# Patient Record
Sex: MALE | Race: WHITE | NOT HISPANIC OR LATINO | Employment: FULL TIME
[De-identification: names, ages, dates, MRNs, and addresses within clinical notes are randomized per-mention and may not be internally consistent; named-entity substitution may affect disease eponyms.]

---

## 2021-06-07 ENCOUNTER — TRANSCRIBE ORDERS (OUTPATIENT)
Dept: OTHER | Age: 61
End: 2021-06-07

## 2021-06-07 ENCOUNTER — HOSPITAL ENCOUNTER (OUTPATIENT)
Dept: CARDIOLOGY | Facility: CLINIC | Age: 61
Discharge: HOME | End: 2021-06-07
Attending: INTERNAL MEDICINE | Admitting: INTERNAL MEDICINE
Payer: COMMERCIAL

## 2021-06-07 DIAGNOSIS — R07.9 CHEST PAIN: ICD-10-CM

## 2021-06-07 DIAGNOSIS — R91.8 PULMONARY NODULES: Primary | ICD-10-CM

## 2021-06-07 DIAGNOSIS — R07.9 CHEST PAIN: Primary | ICD-10-CM

## 2021-06-07 PROCEDURE — 255N000002 HC RX 255 OP 636: Performed by: STUDENT IN AN ORGANIZED HEALTH CARE EDUCATION/TRAINING PROGRAM

## 2021-06-07 PROCEDURE — 93325 DOPPLER ECHO COLOR FLOW MAPG: CPT | Mod: TC

## 2021-06-07 PROCEDURE — 93018 CV STRESS TEST I&R ONLY: CPT | Performed by: STUDENT IN AN ORGANIZED HEALTH CARE EDUCATION/TRAINING PROGRAM

## 2021-06-07 PROCEDURE — 93350 STRESS TTE ONLY: CPT | Mod: 26 | Performed by: STUDENT IN AN ORGANIZED HEALTH CARE EDUCATION/TRAINING PROGRAM

## 2021-06-07 PROCEDURE — 93016 CV STRESS TEST SUPVJ ONLY: CPT | Performed by: STUDENT IN AN ORGANIZED HEALTH CARE EDUCATION/TRAINING PROGRAM

## 2021-06-07 PROCEDURE — 93325 DOPPLER ECHO COLOR FLOW MAPG: CPT | Mod: 26 | Performed by: STUDENT IN AN ORGANIZED HEALTH CARE EDUCATION/TRAINING PROGRAM

## 2021-06-07 PROCEDURE — 93321 DOPPLER ECHO F-UP/LMTD STD: CPT | Mod: 26 | Performed by: STUDENT IN AN ORGANIZED HEALTH CARE EDUCATION/TRAINING PROGRAM

## 2021-06-07 RX ADMIN — PERFLUTREN 5 ML: 6.52 INJECTION, SUSPENSION INTRAVENOUS at 14:02

## 2021-06-07 NOTE — PROGRESS NOTES
Date: 6/7/2021    Time of Call: 8:43 AM     Diagnosis:  Chest pain     [ TORB ] Ordering provider: Yobany Lopez MD  Order: Exercise stress ECHO     Order received by: Meredith Kaufman RN     Follow-up/additional notes:

## 2021-06-08 NOTE — TELEPHONE ENCOUNTER
RECORDS STATUS - ALL OTHER DIAGNOSIS      RECORDS RECEIVED FROM: [place]   DATE RECEIVED: 6/8/21   NOTES STATUS DETAILS   OFFICE NOTE from referring provider CE - [place] 6/4/21   OFFICE NOTE from medical oncologist     DISCHARGE SUMMARY from hospital     DISCHARGE REPORT from the ER     OPERATIVE REPORT     MEDICATION LIST [facility]   CLINICAL TRIAL TREATMENTS TO DATE     LABS     PATHOLOGY REPORTS NA    ANYTHING RELATED TO DIAGNOSIS Epic/CE 5/13/21   GENONOMIC TESTING     TYPE:     IMAGING (NEED IMAGES & REPORT)     CT SCANS PACS 6/3/21, 6/7/16: [place]   MRI     MAMMO     ULTRASOUND     PET

## 2021-06-09 ENCOUNTER — VIRTUAL VISIT (OUTPATIENT)
Dept: PULMONOLOGY | Facility: CLINIC | Age: 61
End: 2021-06-09
Attending: INTERNAL MEDICINE
Payer: COMMERCIAL

## 2021-06-09 ENCOUNTER — PRE VISIT (OUTPATIENT)
Dept: PULMONOLOGY | Facility: CLINIC | Age: 61
End: 2021-06-09

## 2021-06-09 DIAGNOSIS — R91.8 PULMONARY NODULES: ICD-10-CM

## 2021-06-09 PROCEDURE — 99204 OFFICE O/P NEW MOD 45 MIN: CPT | Mod: 95 | Performed by: INTERNAL MEDICINE

## 2021-06-09 RX ORDER — SIMVASTATIN 20 MG
TABLET ORAL
COMMUNITY
Start: 2020-12-28

## 2021-06-09 RX ORDER — WARFARIN SODIUM 5 MG/1
TABLET ORAL
COMMUNITY
Start: 2020-12-28

## 2021-06-09 NOTE — PROGRESS NOTES
"Mendez is a 60 year old who is being evaluated via a billable video visit.      How would you like to obtain your AVS? Mail a copy  If the video visit is dropped, the invitation should be resent by: Send to e-mail at: [email or web address]  Will anyone else be joining your video visit? No    Vitals - Patient Reported  Weight (Patient Reported): 87.5 kg (193 lb)  Height (Patient Reported): 180.3 cm (5' 11\")  BMI (Based on Pt Reported Ht/Wt): 26.92  Pain Score: Mild Pain (2)  Pain Loc: Other - see comment(MID RIGHT BACK)    Video-Visit Details    Converted to phone visit because of technical difficulties (Patient could not open the e-mail link)    Phone time: 28 minutes  Total visit time: 46 minutes    [facility]  Interventional Pulmonary Clinic Virtual Visit Note    June 9, 2021    Chief complaint:  Idris Low is a 60 year old male seen for   Chief Complaint   Patient presents with     Video Visit     NEW; Pulmonary nodules       Reason for clinic visit / Chief complaint:   Pulmonary nodule    Assessment and Plan:  Indeterminate pulmonary nodule, measuring 10 x 7 mm in the right lower lobe.  This is new when compared to previous CT chest from 2016.  I reviewed images of CT scan that were done at [facility].  Patient has not seen a pulmonologist before.  He is interviewed with his brother over the phone today who is a cardiologist.  We discussed possible etiologies of pulmonary nodule and diagnostic approaches, being CT-guided biopsy, PET scan, follow-up CT scan (such as 3 months) or wedge resection.  I I indicated to him that we will review his case this Friday in our multidisciplinary meeting and call him back with the group's decision he is in agreement with this plan.  He indicated that he would prefer not to wait and have an answer as soon as possible.  Chest pain, jabbing, exacerbated by laying down on the right side or coughing.  Likely musculoskeletal in origin as pulmonary nodules " would not cause any such pain.  He did have cardiac work-up in the recent  past.  Smoker, quit 2 weeks ago he has been smoking 45 years averaging pack a day.  COPD changes on CT scan.  Patient has no shortness of breath however has occasional phlegm production which is white.  He has never been hospitalized or visited an emergency room because of respiratory related issues in the past and never used inhaler. No PFTs done in the past. Once the nodule is addressed he will need to have PFTs.  Lower extremity DVT diagnosed 15 years ago and has been on Coumadin.      Billing: Based on Medical Decision Making (Complexity):  L4    History of Present Illness:  This is a 60 years old gentleman again accompanied by his brother who is a physician on today's visit.  His main symptom is chest pain which is jabbing pain and exacerbated by body positioning and cough as described above.  Unlikely to be cardiac origin, denied having other respiratory symptoms except for occasional phlegm production.  No wheezing or shortness of breath.  Has been on Coumadin for the last 15 years because of right lower extremity DVT.  No family history of pulmonary problems or exposure to chemicals, radiation or asbestos in the past.  He has never used inhalers although he has significant changes suggesting COPD/emphysema on his recent CT scan.  He has been working as a .       No Known Allergies     No past medical history on file.     No past surgical history on file.     Social History     Socioeconomic History     Marital status:      Spouse name: Not on file     Number of children: Not on file     Years of education: Not on file     Highest education level: Not on file   Occupational History     Not on file   Social Needs     Financial resource strain: Not on file     Food insecurity     Worry: Not on file     Inability: Not on file     Transportation needs     Medical: Not on file     Non-medical: Not on file   Tobacco Use      Smoking status: Not on file   Substance and Sexual Activity     Alcohol use: Not on file     Drug use: Not on file     Sexual activity: Not on file   Lifestyle     Physical activity     Days per week: Not on file     Minutes per session: Not on file     Stress: Not on file   Relationships     Social connections     Talks on phone: Not on file     Gets together: Not on file     Attends Protestant service: Not on file     Active member of club or organization: Not on file     Attends meetings of clubs or organizations: Not on file     Relationship status: Not on file     Intimate partner violence     Fear of current or ex partner: Not on file     Emotionally abused: Not on file     Physically abused: Not on file     Forced sexual activity: Not on file   Other Topics Concern     Not on file   Social History Narrative     Not on file        No family history on file.     Immunization History   Administered Date(s) Administered     COVID-19,SANYA,Pfizer 03/24/2021, 04/14/2021       Current Outpatient Medications   Medication Sig     simvastatin (ZOCOR) 20 MG tablet One tablet daily. Pt due for annual exam prior to further refills.     warfarin ANTICOAGULANT (COUMADIN) 5 MG tablet Take 2.5 mg on Tuesdays and Saturdays and 5 mg all other days of the week or as directed by coag clinic.     No current facility-administered medications for this visit.         Review of Systems:  I have done 10 points of review systems and all negative except for those mentioned in HPI    Physical examination  Constitutional: Oriented, not in distress  Respiratory: Normal tidal breathing, no shortness of breath, no audible wheezing or stridor over the phone or video visit  Neurological: Alert, orientedx3  Psychiatric:  Mood and affect are appropriate with insight into his/her medical condition      DONAL Daugherty MD

## 2021-06-09 NOTE — LETTER
Date:Rachel 10, 2021      Patient was self referred, no letter generated. Do not send.        [facility] Health Information

## 2021-06-09 NOTE — LETTER
"6/9/2021       RE: Idris Low  [address]  [place] [address]     Dear Colleague,    [facility] you [facility] your [facility] Idris Low[facility]. Please see a copy of my visit note below.    Mendez is a 60 year old who is being evaluated via a billable video visit.      How would you like to obtain your AVS? Mail a copy  If the video visit is dropped, the invitation should be resent by: Send to e-mail at: [email or web address]  Will anyone else be joining your video visit? No    Vitals - Patient Reported  Weight (Patient Reported): 87.5 kg (193 lb)  Height (Patient Reported): 180.3 cm (5' 11\")  BMI (Based on Pt Reported Ht/Wt): 26.92  Pain Score: Mild Pain (2)  Pain Loc: Other - see comment(MID RIGHT BACK)    Video-Visit Details    Converted to phone visit because of technical difficulties (Patient could not open the e-mail link)    Phone time: 28 minutes  Total visit time: 46 minutes    [facility]  Interventional Pulmonary Clinic Virtual Visit Note    June 9, 2021    Chief complaint:  Idris Low is a 60 year old male seen for   Chief Complaint   Patient presents with     Video Visit     NEW; Pulmonary nodules       Reason for clinic visit / Chief complaint:   Pulmonary nodule    Assessment and Plan:  Indeterminate pulmonary nodule, measuring 10 x 7 mm in the right lower lobe.  This is new when compared to previous CT chest from 2016.  I reviewed images of CT scan that were done at [facility].  Patient has not seen a pulmonologist before.  He is interviewed with his brother over the phone today who is a cardiologist.  We discussed possible etiologies of pulmonary nodule and diagnostic approaches, being CT-guided biopsy, PET scan, follow-up CT scan (such as 3 months) or wedge resection.  I I indicated to him that we will review his case this Friday in our multidisciplinary meeting and " call him back with the group's decision he is in agreement with this plan.  He indicated that he would prefer not to wait and have an answer as soon as possible.  Chest pain, jabbing, exacerbated by laying down on the right side or coughing.  Likely musculoskeletal in origin as pulmonary nodules would not cause any such pain.  He did have cardiac work-up in the recent  past.  Smoker, quit 2 weeks ago he has been smoking 45 years averaging pack a day.  COPD changes on CT scan.  Patient has no shortness of breath however has occasional phlegm production which is white.  He has never been hospitalized or visited an emergency room because of respiratory related issues in the past and never used inhaler. No PFTs done in the past. Once the nodule is addressed he will need to have PFTs.  Lower extremity DVT diagnosed 15 years ago and has been on Coumadin.      Billing: Based on Medical Decision Making (Complexity):  L4    History of Present Illness:  This is a 60 years old gentleman again accompanied by his brother who is a physician on today's visit.  His main symptom is chest pain which is jabbing pain and exacerbated by body positioning and cough as described above.  Unlikely to be cardiac origin, denied having other respiratory symptoms except for occasional phlegm production.  No wheezing or shortness of breath.  Has been on Coumadin for the last 15 years because of right lower extremity DVT.  No family history of pulmonary problems or exposure to chemicals, radiation or asbestos in the past.  He has never used inhalers although he has significant changes suggesting COPD/emphysema on his recent CT scan.  He has been working as a .       No Known Allergies     No past medical history on file.     No past surgical history on file.     Social History     Socioeconomic History     Marital status:      Spouse name: Not on file     Number of children: Not on file     Years of education: Not on file      Highest education level: Not on file   Occupational History     Not on file   Social Needs     Financial resource strain: Not on file     Food insecurity     Worry: Not on file     Inability: Not on file     Transportation needs     Medical: Not on file     Non-medical: Not on file   Tobacco Use     Smoking status: Not on file   Substance and Sexual Activity     Alcohol use: Not on file     Drug use: Not on file     Sexual activity: Not on file   Lifestyle     Physical activity     Days per week: Not on file     Minutes per session: Not on file     Stress: Not on file   Relationships     Social connections     Talks on phone: Not on file     Gets together: Not on file     Attends Restoration service: Not on file     Active member of club or organization: Not on file     Attends meetings of clubs or organizations: Not on file     Relationship status: Not on file     Intimate partner violence     Fear of current or ex partner: Not on file     Emotionally abused: Not on file     Physically abused: Not on file     Forced sexual activity: Not on file   Other Topics Concern     Not on file   Social History Narrative     Not on file        No family history on file.     Immunization History   Administered Date(s) Administered     COVID-19,PF,Pfizer 03/24/2021, 04/14/2021       Current Outpatient Medications   Medication Sig     simvastatin (ZOCOR) 20 MG tablet One tablet daily. Pt due for annual exam prior to further refills.     warfarin ANTICOAGULANT (COUMADIN) 5 MG tablet Take 2.5 mg on Tuesdays and Saturdays and 5 mg all other days of the week or as directed by coag clinic.     No current facility-administered medications for this visit.         Review of Systems:  I have done 10 points of review systems and all negative except for those mentioned in HPI    Physical examination  Constitutional: Oriented, not in distress  Respiratory: Normal tidal breathing, no shortness of breath, no audible wheezing or stridor over the  phone or video visit  Neurological: Alert, orientedx3  Psychiatric:  Mood and affect are appropriate with insight into his/her medical condition      DONAL Daugherty MD        Again, thank you for allowing me to participate in the care of your patient.      Sincerely,    Bhavik Daugherty MD

## 2021-06-17 ENCOUNTER — TELEPHONE (OUTPATIENT)
Dept: SURGERY | Facility: CLINIC | Age: 61
End: 2021-06-17

## 2021-06-17 NOTE — TELEPHONE ENCOUNTER
Called patient as he was called two times by scheduling and he did not answer, his voicemail box was also full. Also called patients spouse and left a voicemail asking patient to call us back.

## 2025-01-07 ENCOUNTER — ANCILLARY PROCEDURE (OUTPATIENT)
Dept: GENERAL RADIOLOGY | Facility: CLINIC | Age: 65
End: 2025-01-07
Attending: PHYSICIAN ASSISTANT
Payer: COMMERCIAL

## 2025-01-07 ENCOUNTER — OFFICE VISIT (OUTPATIENT)
Dept: URGENT CARE | Facility: URGENT CARE | Age: 65
End: 2025-01-07
Payer: COMMERCIAL

## 2025-01-07 VITALS
SYSTOLIC BLOOD PRESSURE: 157 MMHG | HEART RATE: 78 BPM | OXYGEN SATURATION: 99 % | WEIGHT: 198 LBS | RESPIRATION RATE: 16 BRPM | TEMPERATURE: 98.2 F | DIASTOLIC BLOOD PRESSURE: 89 MMHG

## 2025-01-07 DIAGNOSIS — M79.642 PAIN OF LEFT HAND: ICD-10-CM

## 2025-01-07 DIAGNOSIS — S69.92XA HAND INJURY, LEFT, INITIAL ENCOUNTER: Primary | ICD-10-CM

## 2025-01-07 DIAGNOSIS — S61.412A LACERATION OF LEFT HAND WITHOUT FOREIGN BODY, INITIAL ENCOUNTER: ICD-10-CM

## 2025-01-07 DIAGNOSIS — S69.92XA HAND INJURY, LEFT, INITIAL ENCOUNTER: ICD-10-CM

## 2025-01-07 DIAGNOSIS — S60.222A CONTUSION OF LEFT HAND, INITIAL ENCOUNTER: ICD-10-CM

## 2025-01-07 DIAGNOSIS — Z79.01 LONG TERM CURRENT USE OF ANTICOAGULANT THERAPY: ICD-10-CM

## 2025-01-07 PROCEDURE — 73130 X-RAY EXAM OF HAND: CPT | Mod: TC | Performed by: RADIOLOGY

## 2025-01-07 RX ORDER — ATORVASTATIN CALCIUM 40 MG/1
TABLET, FILM COATED ORAL
COMMUNITY

## 2025-01-07 RX ORDER — SILDENAFIL 100 MG/1
100 TABLET, FILM COATED ORAL
COMMUNITY
Start: 2024-01-05

## 2025-01-08 NOTE — PATIENT INSTRUCTIONS
January 7, 2025 Urgent Care Plan:     -Call the number I provided to check on your final x-ray report (radiologist report) late tomorrow (late afternoon or early evening)     -I applied surgical adhesive to your superficial left hand laceration today. Do not apply topical antibiotic or other ointment as that will dissolve the adhesive.     -I prescribed an antibiotic (Augmentin) for infection prevention.     -You should check with your INR team to let them know you have been prescribed antibiotic. You INR can be affected by antibiotics. Your INR nurse will let you know if they would like you to have a short interval INR check.     -Watch for signs and symptoms of infection (as we reviewed today)

## 2025-01-08 NOTE — PROGRESS NOTES
ASSESSMENT/PLAN:    (S69.92XA) Hand injury, left, initial encounter  (primary encounter diagnosis)    MDM: Left hand contusion and superficial laceration dorsum of left hand related to work injury sustained while working with 447 pound pig as described in HPI.  X-ray is negative for acute fracture on my impression.  Interestingly, there is an opaque foreign body finding in an unrelated area.  Patient states he has no history of prior injury or prior suspected foreign body in this area.  Current injury does not involve area of radiopaque finding/no concern for acute FB on xray in area of current injury.  I reviewed the actual x-ray images with patient today during his urgent care visit.  Final radiology report is pending, but I suspect artifact is because of radiopaque finding.  Discussed with patient distant foreign body that he does not recall is also possible.  A laceration is superficial, without active bleeding, and I advised sutures will not improve approximation, so surgical adhesive was applied instead.  Wound was cleaned with Hibiclens and sterile water prior to application of surgical adhesive.  As per patient's desire, prophylactic antibiotic (Augmentin) was prescribed.  Patient is educated he will need to reach out to INR team to have a short interval INR recheck (his use of antibiotics can affect INR).  Criteria for follow-up were reviewed.  Please also see the below discharge summary/plan (which I reviewed with patient verbally today and provided in printed form for home review).    Plan: XR Hand Left G/E 3 Views,         amoxicillin-clavulanate (AUGMENTIN) 875-125 MG         tablet          AVS/Plan:     January 7, 2025 Urgent Care Plan:     -Call the number I provided to check on your final x-ray report (radiologist report) late tomorrow (late afternoon or early evening)     -I applied surgical adhesive to your superficial left hand laceration today. Do not apply topical antibiotic or other  "ointment as that will dissolve the adhesive.     -I prescribed an antibiotic (Augmentin) for infection prevention.     -You should check with your INR team to let them know you have been prescribed antibiotic. You INR can be affected by antibiotics. Your INR nurse will let you know if they would like you to have a short interval INR check.     -Watch for signs and symptoms of infection (as we reviewed today)       (M79.642) Pain of left hand  Plan: XR Hand Left G/E 3 Views    (S61.412A) Laceration of left hand without foreign body, initial encounter  Plan: amoxicillin-clavulanate (AUGMENTIN) 875-125 MG         tablet    (Z79.01) Long term current use of anticoagulant therapy      (S60.222A) Contusion of left hand, initial encounter      This progress note has been dictated, with use of voice recognition software. Any grammatical, typographical, or context errors are unintentional and inherent to use of voice recognition software.  ------------      Chief Complaint   Patient presents with    Urgent Care     Smashed his left hand today at noon on the gate   Last tdap 2016       Idris Low is a right hand dominant 64-year-old male, with a past medical history that includes long-term use of anticoagulant therapy (warfarin) presenting to urgent care today for evaluation of work related left hand injury and laceration he sustained at approximately 12 noon today. Patient expresses interest in infection prevention.     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  While at work earlier today, he had a 447 pound pig in a snare-the pig \"threw it's head\" forcefully and smashed his left hand (he was reportedly wearing a leather glove on hand) into a metal farm gait.  Initially, patient thought his hand was okay (glove did not rip or tear)--but upon removal of glove he noted a laceration (dorsum of left hand from which he \"peeled a piece of loose " "skin off\" and swelling (dorsum of left hand).  Patient tells me he immediately irrigated the laceration with sterile saline and a diluted Betadine solution, and applied antibiotic ointment.     As per above, patient confirms the laceration did not come into contact with any manure, itzel objects or other dirty materials (remained contained within his work glove) . No holes or rips in work glove.     No active bleeding (patient notes the laceration seems superficial).  He does note some swelling starting (points to dorsum of hand).  He currently reports full range of motion left hand.  He denies any numbness, tingling, or decrease sensitivity to digits of left hand.      No past medical history on file.    There is no problem list on file for this patient.    Current Outpatient Medications   Medication Sig Dispense Refill    sildenafil (VIAGRA) 100 MG tablet Take 100 mg by mouth.      warfarin ANTICOAGULANT (COUMADIN) 5 MG tablet Take 2.5 mg on Tuesdays and Saturdays and 5 mg all other days of the week or as directed by coag clinic.      atorvastatin (LIPITOR) 40 MG tablet TAKE 1 TABLET BY MOUTH ONCE DAILY FOR HIGH CHOLESTEROL      simvastatin (ZOCOR) 20 MG tablet One tablet daily. Pt due for annual exam prior to further refills. (Patient not taking: Reported on 1/7/2025)       No current facility-administered medications for this visit.        LEFT HAND: Positive for mild, generalized, swelling over second, third, and fourth mid metacarpal bones.  Positive for superficial laceration (approximately 2 cm in size), without any active bleeding, and is noted to be clean, shallow, and well-approximated).  Specifically no swelling, pain, laceration, deformity at base of ring finger, and the fourth and fifth interdigital space, or along ring finger phalanx (where unexpected radiopaque finding was noted on my review of x-rays today). No obvious hand or finger deformity. No bruising or redness..  Positive for non-focal " tenderness to palpation of 2nd,3rd and 4th mid-metacarpal region. . Non-tender on palpation of remainder of digits, hand, wrist, ulna, radius or elbow. Circulation and sensation intact to distal fingertips bilaterally. FROM all digits today.   There is not compromise to the distal circulation.  Pulses are +2 and CRT is brisk  GENERAL APPEARANCE: healthy, alert and no distress  EXTREMITIES: peripheral pulses normal  SKIN: no suspicious lesions or rashes  NEURO: Normal strength and tone, sensory exam grossly normal, mentation intact and speech normal    X-RAY LEFT HAND:  X-ray is negative for acute fracture and negative for dislocation on my impression.  Interestingly, there is an opaque foreign body finding in an unrelated area.  Patient states he has no history of prior injury or prior suspected foreign body in this area.  Current injury does not involve area of radiopaque finding.  I reviewed the actual x-ray images and this finding with patient today during his urgent care visit.  Final radiology report is pending, but I suspect artifact is because of radiopaque finding.  Discussed with patient distant foreign body that he does not recall is also possible.

## 2025-02-01 ENCOUNTER — HEALTH MAINTENANCE LETTER (OUTPATIENT)
Age: 65
End: 2025-02-01